# Patient Record
(demographics unavailable — no encounter records)

---

## 2024-12-02 NOTE — PHYSICAL EXAM
[General Appearance - Alert] : alert [General Appearance - In No Acute Distress] : in no acute distress [Extraocular Movements] : extraocular movements were intact [Musculoskeletal - Swelling] : no joint swelling seen
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
## 2024-12-02 NOTE — DATA REVIEWED
[FreeTextEntry1] : DEXA (9/16/24)  IMPRESSION:  Spine:  T-score: -1.3  Z-score: 0.1  BMD: 0.909 g/cm2   Femoral neck:  T-score: -1.1  Z-score: 0.1  BMD:  0.722 g/cm2   Total hip:  T-score: -0.9  Z-score:  0.0  BMD:  0.830 g/cm2
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
## 2024-12-31 NOTE — ASSESSMENT
[FreeTextEntry1] : Obesity with prediabetes -obesity associated but no diabetes, hypertension, CVA, MI, variety of cancer has been discussed with her at great length.  She understood importance of weight reduction with diet and exercise.  She is not able to lose.  Low-calorie diet has been discussed with her again.  Her insurance did not approve Wegovy.  Shortness of breath with chest tightness and multiple CAD risk factors - echocardiogram confirmed normal LV size, LV thickness, LV wall motion, LVEF, valve morphology.  Exercise nuclear stress did not confirm inducible ischemia  Moderate sleep apnea -CPAP study has been recommended  Aggressive risk factor modification has been discussed with her at great length include regular walking, compliance to medication, low-salt/low-cholesterol/diabetic diet.  She will be reeval by me in 6 months or sooner if needed
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
## 2025-07-24 NOTE — ASSESSMENT
[FreeTextEntry1] : Obesity with prediabetes -obesity associated but no diabetes, hypertension, CVA, MI, variety of cancer has been discussed with her at great length.  She understood importance of weight reduction with diet and exercise.  She is not able to lose.  Low-calorie diet has been discussed with her again.  Her insurance did not approve Wegovy.  Shortness of breath with chest tightness and multiple CAD risk factors - echocardiogram confirmed normal LV size, LV thickness, LV wall motion, LVEF, valve morphology.  Exercise nuclear stress did not confirm inducible ischemia  Moderate sleep apnea -CPAP study has been recommended  Preop cardiac evaluation prior to leg surgery -clinically patient has no evidence of ACS or CHF.  Hemodynamically stable.  No inducible ischemia on Myocard perfusion scan and normal LV function on echocardiogram.  Overall cardiac wise she is maximized for the planned procedure.  Proceed.  Close monitor vitals perioperatively.  Please call me if I can assist you further.  Aggressive risk factor modification has been discussed with her at great length include regular walking, compliance to medication, low-salt/low-cholesterol/diabetic diet.  She will be reeval by me in 6 months or sooner if needed.  Lipid panel as well as A1c has been recommended.
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
